# Patient Record
Sex: FEMALE | Race: ASIAN | NOT HISPANIC OR LATINO | URBAN - METROPOLITAN AREA
[De-identification: names, ages, dates, MRNs, and addresses within clinical notes are randomized per-mention and may not be internally consistent; named-entity substitution may affect disease eponyms.]

---

## 2022-07-06 ENCOUNTER — LAB OUTSIDE AN ENCOUNTER (OUTPATIENT)
Dept: URBAN - METROPOLITAN AREA CLINIC 78 | Facility: CLINIC | Age: 72
End: 2022-07-06

## 2022-07-06 ENCOUNTER — OFFICE VISIT (OUTPATIENT)
Dept: URBAN - METROPOLITAN AREA CLINIC 78 | Facility: CLINIC | Age: 72
End: 2022-07-06
Payer: MEDICARE

## 2022-07-06 VITALS
DIASTOLIC BLOOD PRESSURE: 88 MMHG | HEART RATE: 90 BPM | BODY MASS INDEX: 23.22 KG/M2 | TEMPERATURE: 98 F | SYSTOLIC BLOOD PRESSURE: 144 MMHG | HEIGHT: 64 IN | WEIGHT: 136 LBS

## 2022-07-06 DIAGNOSIS — R10.13 EPIGASTRIC PAIN: ICD-10-CM

## 2022-07-06 DIAGNOSIS — K82.8 ADENOMYOSIS, GALLBLADDER: ICD-10-CM

## 2022-07-06 DIAGNOSIS — K76.89 LESION OF LIVER: ICD-10-CM

## 2022-07-06 DIAGNOSIS — Z86.010 HISTORY OF COLON POLYPS: ICD-10-CM

## 2022-07-06 PROBLEM — 300331000: Status: ACTIVE | Noted: 2022-07-06

## 2022-07-06 PROCEDURE — 1036F TOBACCO NON-USER: CPT | Performed by: INTERNAL MEDICINE

## 2022-07-06 PROCEDURE — G9903 PT SCRN TBCO ID AS NON USER: HCPCS | Performed by: INTERNAL MEDICINE

## 2022-07-06 PROCEDURE — G8420 CALC BMI NORM PARAMETERS: HCPCS | Performed by: INTERNAL MEDICINE

## 2022-07-06 PROCEDURE — G9622 NO UNHEAL ETOH USER: HCPCS | Performed by: INTERNAL MEDICINE

## 2022-07-06 PROCEDURE — 99214 OFFICE O/P EST MOD 30 MIN: CPT | Performed by: INTERNAL MEDICINE

## 2022-07-06 PROCEDURE — 3017F COLORECTAL CA SCREEN DOC REV: CPT | Performed by: INTERNAL MEDICINE

## 2022-07-06 PROCEDURE — G8427 DOCREV CUR MEDS BY ELIG CLIN: HCPCS | Performed by: INTERNAL MEDICINE

## 2022-07-06 RX ORDER — SODIUM, POTASSIUM,MAG SULFATES 17.5-3.13G
17.5-13.3-1.6 GM/177ML SOLUTION, RECONSTITUTED, ORAL ORAL AS DIRECTED
Qty: 354 MILLILITER | OUTPATIENT
Start: 2022-07-06 | End: 2022-07-07

## 2022-07-06 NOTE — PREVIOUS WORKUP REVIEWED
- REVIEWED EXTERNAL MEDICAL RECORD LABS -Labs 7/9/2019: hemoglobin A 1C 6.2, WBC 5.8, platelet 245, hemoglobin 13, total bilirubin 0.6, alkaline phosphatase 80, AST 21, ALT 17, glucose 109, BUN 11, creatinine 0.6, sodium 145. TSH 0.8.IMAGES -Gastric empty study 10/3/2019: normal.-Right upper quadrant ultrasound 7/31/2019: mild fatty infiltration of liver. Septated liver cyst, 3.8 x 1.9 x 3.9 cm., Bile duct 4 mm. No gallstone. Normal gallbladder.-MRI with and without contrast liver protocol, 8/21/2019: mild hepatic steatosis. Benign appearing septated cyst in the left hepatic lobe. 3.9 x 2.2 cm in size. No interval change in its size compared to 2015 CT scan. Mild gallbladder fundal adenomyomatosis. Mild ectasia of the biliary system. No gallstone. Some sludge in the gallbladder lumen. No pancreatic ductal dilatation. Normal spleen. No dilated loops of bowel. Colonic diverticulosis. Patent portal vein, superior mesenteric vein, splenic vein.ENDOSCOPIES -EGD 10/23/2018: normal esophagus. Normal stomach. Biopsy from the stomach. Normal duodenum.-Colonoscopy 10/23/2018:diverticulosis in the sigmoid colon. Normal terminal ileum and colon. A single sessile 4 mm polyp in the sigmoid colon. Removed with hot forceps.*Pathology: reactive gastropathy, tubular adenoma.

## 2022-07-06 NOTE — HPI-TODAY'S VISIT:
72-year-old female presents for frequent burping, epigastric pain, indigestion. Recently she started taking Nexium for epigastric pain.  It gets worse after eating spicy food.  Goes between epigastric and right upper quadrant area. She has a history of gallbladder sludge and adenomyomatosis of the gallbladder.  Saw Dr. Coburn general surgeon, optional elective cholecystectomy was suggested, she opted not to proceed. She moves bowel daily, denies constipation. Denies unintentional weight loss.

## 2022-07-07 PROBLEM — 266435005: Status: ACTIVE | Noted: 2022-07-06

## 2022-07-11 ENCOUNTER — TELEPHONE ENCOUNTER (OUTPATIENT)
Dept: URBAN - METROPOLITAN AREA CLINIC 12 | Facility: CLINIC | Age: 72
End: 2022-07-11

## 2022-07-12 ENCOUNTER — TELEPHONE ENCOUNTER (OUTPATIENT)
Dept: URBAN - METROPOLITAN AREA CLINIC 77 | Facility: CLINIC | Age: 72
End: 2022-07-12

## 2022-07-12 RX ORDER — SODIUM, POTASSIUM,MAG SULFATES 17.5-3.13G
17.5-13.3-1.6 GM/177ML SOLUTION, RECONSTITUTED, ORAL ORAL AS DIRECTED
Qty: 354 MILLILITER
Start: 2022-07-06 | End: 2022-07-13

## 2022-07-15 ENCOUNTER — OFFICE VISIT (OUTPATIENT)
Dept: URBAN - METROPOLITAN AREA SURGERY CENTER 15 | Facility: SURGERY CENTER | Age: 72
End: 2022-07-15
Payer: MEDICARE

## 2022-07-15 ENCOUNTER — TELEPHONE ENCOUNTER (OUTPATIENT)
Dept: URBAN - METROPOLITAN AREA CLINIC 78 | Facility: CLINIC | Age: 72
End: 2022-07-15

## 2022-07-15 ENCOUNTER — CLAIMS CREATED FROM THE CLAIM WINDOW (OUTPATIENT)
Dept: URBAN - METROPOLITAN AREA CLINIC 4 | Facility: CLINIC | Age: 72
End: 2022-07-15
Payer: MEDICARE

## 2022-07-15 DIAGNOSIS — K31.89 ACQUIRED DEFORMITY OF DUODENUM: ICD-10-CM

## 2022-07-15 DIAGNOSIS — Z86.010 ADENOMAS PERSONAL HISTORY OF COLONIC POLYPS: ICD-10-CM

## 2022-07-15 DIAGNOSIS — K31.89 OTHER DISEASES OF STOMACH AND DUODENUM: ICD-10-CM

## 2022-07-15 PROCEDURE — G0105 COLORECTAL SCRN; HI RISK IND: HCPCS | Performed by: INTERNAL MEDICINE

## 2022-07-15 PROCEDURE — 88312 SPECIAL STAINS GROUP 1: CPT | Performed by: PATHOLOGY

## 2022-07-15 PROCEDURE — G8907 PT DOC NO EVENTS ON DISCHARG: HCPCS | Performed by: INTERNAL MEDICINE

## 2022-07-15 PROCEDURE — 88305 TISSUE EXAM BY PATHOLOGIST: CPT | Performed by: PATHOLOGY

## 2022-07-15 PROCEDURE — 43239 EGD BIOPSY SINGLE/MULTIPLE: CPT | Performed by: INTERNAL MEDICINE

## 2022-07-15 RX ORDER — METOCLOPRAMIDE HYDROCHLORIDE 5 MG/1
1 TABLET TABLET ORAL QHS
Qty: 30 | Refills: 1 | OUTPATIENT
Start: 2022-07-15

## 2022-07-27 PROBLEM — 428283002: Status: ACTIVE | Noted: 2022-07-06

## 2022-07-27 PROBLEM — 300331000 LESION OF LIVER: Status: ACTIVE | Noted: 2022-07-27

## 2023-07-05 ENCOUNTER — OFFICE VISIT (OUTPATIENT)
Dept: URBAN - METROPOLITAN AREA CLINIC 78 | Facility: CLINIC | Age: 73
End: 2023-07-05

## 2023-07-12 ENCOUNTER — CLAIMS CREATED FROM THE CLAIM WINDOW (OUTPATIENT)
Dept: URBAN - METROPOLITAN AREA CLINIC 78 | Facility: CLINIC | Age: 73
End: 2023-07-12
Payer: MEDICARE

## 2023-07-12 ENCOUNTER — OFFICE VISIT (OUTPATIENT)
Dept: URBAN - METROPOLITAN AREA CLINIC 78 | Facility: CLINIC | Age: 73
End: 2023-07-12

## 2023-07-12 ENCOUNTER — CLAIMS CREATED FROM THE CLAIM WINDOW (OUTPATIENT)
Dept: URBAN - METROPOLITAN AREA CLINIC 78 | Facility: CLINIC | Age: 73
End: 2023-07-12

## 2023-07-12 ENCOUNTER — LAB OUTSIDE AN ENCOUNTER (OUTPATIENT)
Dept: URBAN - METROPOLITAN AREA CLINIC 78 | Facility: CLINIC | Age: 73
End: 2023-07-12

## 2023-07-12 VITALS
HEART RATE: 73 BPM | HEIGHT: 64 IN | DIASTOLIC BLOOD PRESSURE: 83 MMHG | SYSTOLIC BLOOD PRESSURE: 155 MMHG | TEMPERATURE: 98 F | BODY MASS INDEX: 23.05 KG/M2 | WEIGHT: 135 LBS

## 2023-07-12 DIAGNOSIS — K76.89 ABNORMAL FINDING ON LIVER FUNCTION: ICD-10-CM

## 2023-07-12 DIAGNOSIS — K82.8 ADENOMYOSIS, GALLBLADDER: ICD-10-CM

## 2023-07-12 DIAGNOSIS — K76.9 LIVER LESION: ICD-10-CM

## 2023-07-12 PROCEDURE — 99214 OFFICE O/P EST MOD 30 MIN: CPT | Performed by: INTERNAL MEDICINE

## 2023-07-12 NOTE — HPI-TODAY'S VISIT:
73-year-old female presents to discuss last EGD colonoscopy findings. She also reports painful nodule in the right upper quadrant area, subcutaneous. Frequent belching, chronic, is better controlled now.  She takes esomeprazole. Also reports symptoms of internal hemorrhoids, staining on underwear, hard to completely clean after bowel movements. Pelvic pressure when standing for prolonged time.  Frequent nocturnal urination. She did not get gallbladder surgery for adenomyomatosis and sludge after consulted a general surgeon who recommended operation is optional, not proceed by patient preference.

## 2023-07-12 NOTE — PREVIOUS WORKUP REVIEWED
- , - REVIEWED EXTERNAL MEDICAL RECORD LABS -Labs 5/12/2023: BUN 13, creatinine 0.52, total bilirubin 0.8, alkaline phosphatase 83, AST 22, ALT 29, hemoglobin A1c 6.7, WBC 5.8, hemoglobin 14.6, platelet 197. TSH 0.5.-Labs 7/9/2019: hemoglobin A 1C 6.2, WBC 5.8, platelet 245, hemoglobin 13, total bilirubin 0.6, alkaline phosphatase 80, AST 21, ALT 17, glucose 109, BUN 11, creatinine 0.6, sodium 145. TSH 0.8.IMAGES -Gastric empty study 10/3/2019: normal.-Right upper quadrant ultrasound 7/31/2019: mild fatty infiltration of liver. Septated liver cyst, 3.8 x 1.9 x 3.9 cm., Bile duct 4 mm. No gallstone. Normal gallbladder.-MRI with and without contrast liver protocol, 8/21/2019: mild hepatic steatosis. Benign appearing septated cyst in the left hepatic lobe. 3.9 x 2.2 cm in size. No interval change in its size compared to 2015 CT scan. Mild gallbladder fundal adenomyomatosis. Mild ectasia of the biliary system. No gallstone. Some sludge in the gallbladder lumen. No pancreatic ductal dilatation. Normal spleen. No dilated loops of bowel. Colonic diverticulosis. Patent portal vein, superior mesenteric vein, splenic vein.ENDOSCOPIES -EGD 7/15/2022: Adherent blood in the gastric antrum.  Mild inflammation in the duodenal bulb.  Normal esophagus.  EGD in 3 years.-Colonoscopy 7/15/2022: Normal TI. Diverticulosis in the sigmoid and transverse colon.  Otherwise normal colon.  Colonoscopy in 5 years.*Pathology: Gastric antrum-foveolar hyperplasia, negative for H. pylori and intestinal metaplasia. Gastric body-normal.-EGD 10/23/2018: normal esophagus. Normal stomach. Biopsy from the stomach. Normal duodenum.-Colonoscopy 10/23/2018:diverticulosis in the sigmoid colon. Normal terminal ileum and colon. A single sessile 4 mm polyp in the sigmoid colon. Removed with hot forceps.*Pathology: reactive gastropathy, tubular adenoma.

## 2023-08-02 ENCOUNTER — DASHBOARD ENCOUNTERS (OUTPATIENT)
Age: 73
End: 2023-08-02

## 2023-08-16 ENCOUNTER — OFFICE VISIT (OUTPATIENT)
Dept: URBAN - METROPOLITAN AREA CLINIC 77 | Facility: CLINIC | Age: 73
End: 2023-08-16
Payer: MEDICARE

## 2023-08-16 DIAGNOSIS — K76.0 FATTY LIVER: ICD-10-CM

## 2023-08-16 DIAGNOSIS — K76.89 LESION OF LIVER: ICD-10-CM

## 2023-08-16 DIAGNOSIS — K82.8 ADENOMYOSIS, GALLBLADDER: ICD-10-CM

## 2023-08-16 PROCEDURE — 76705 ECHO EXAM OF ABDOMEN: CPT | Performed by: INTERNAL MEDICINE

## 2024-08-08 ENCOUNTER — OFFICE VISIT (OUTPATIENT)
Dept: URBAN - METROPOLITAN AREA CLINIC 78 | Facility: CLINIC | Age: 74
End: 2024-08-08